# Patient Record
Sex: MALE | Race: WHITE
[De-identification: names, ages, dates, MRNs, and addresses within clinical notes are randomized per-mention and may not be internally consistent; named-entity substitution may affect disease eponyms.]

---

## 2019-02-11 ENCOUNTER — HOSPITAL ENCOUNTER (OUTPATIENT)
Dept: HOSPITAL 76 - DI | Age: 24
Discharge: HOME | End: 2019-02-11
Attending: FAMILY MEDICINE
Payer: COMMERCIAL

## 2019-02-11 DIAGNOSIS — R20.2: Primary | ICD-10-CM

## 2019-02-11 PROCEDURE — 72141 MRI NECK SPINE W/O DYE: CPT

## 2019-02-11 NOTE — MRI REPORT
Reason:  PARESTHESIA OF SKIN

Procedure Date:  02/11/2019   

Accession Number:  [id number] / [id number]                    

Procedure:  MRI - Cervical Spine W/O CPT Code:  

 

FULL RESULT:

 

 

EXAM:

MRI CERVICAL SPINE WITHOUT CONTRAST

 

EXAM DATE: 2/11/2019 10:03 AM.

 

CLINICAL HISTORY: Neck pain.

 

COMPARISONS: None.

 

TECHNIQUE: Multiplanar, multisequence T1-weighted and fluid-sensitive 

sequences of the cervical spine without contrast. Other: None.

 

FINDINGS:

 

There is normal alignment of the cervical spine. There is slight 

desiccation of the L5-S1 disk space without significant loss of height.

 

There are normal signal intensities demonstrated throughout the cervical 

spinal cord.

 

The bone marrow signal intensities are normal.

 

The craniocervical junction is normal.

 

C2-C3: There is no significant disk bulge, central or foraminal stenosis. 

The facets are normal.

 

C3-C4: There is no significant disk bulge, central or foraminal stenosis. 

The facets are normal.

 

C4-C5: There is no significant disk bulge, central or foraminal stenosis. 

The facets are normal.

 

C5-C6: There is no significant disk bulge, central or foraminal stenosis. 

The facets are normal.

 

C6-C7: There is no significant disk bulge, central or foraminal stenosis. 

The facets are normal.

 

C7-T1: There is no significant disk bulge, central or foraminal stenosis. 

The facets are normal.

IMPRESSION:

 

1. There is no significant disk bulge, central, or foraminal stenosis 

throughout the cervical spine.

2. There is minimal desiccation of the C5-C6 disk space without 

significant loss of height.

## 2019-06-27 ENCOUNTER — HOSPITAL ENCOUNTER (OUTPATIENT)
Age: 24
End: 2019-06-27
Payer: OTHER GOVERNMENT
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
## 2019-06-27 DIAGNOSIS — M48.07: ICD-10-CM

## 2019-06-27 DIAGNOSIS — S32.020A: Primary | ICD-10-CM

## 2019-06-27 DIAGNOSIS — M51.36: ICD-10-CM

## 2019-06-27 DIAGNOSIS — M51.37: ICD-10-CM

## 2019-06-27 DIAGNOSIS — M48.061: ICD-10-CM

## 2019-06-27 PROCEDURE — 72148 MRI LUMBAR SPINE W/O DYE: CPT

## 2020-08-26 ENCOUNTER — HOSPITAL ENCOUNTER (OUTPATIENT)
Age: 25
End: 2020-08-26
Payer: OTHER GOVERNMENT

## 2020-08-26 DIAGNOSIS — M25.511: Primary | ICD-10-CM

## 2020-08-26 DIAGNOSIS — M19.011: ICD-10-CM

## 2020-08-26 PROCEDURE — 23350 INJECTION FOR SHOULDER X-RAY: CPT

## 2020-08-26 PROCEDURE — 73222 MRI JOINT UPR EXTREM W/DYE: CPT

## 2020-08-26 PROCEDURE — 77002 NEEDLE LOCALIZATION BY XRAY: CPT

## 2020-08-26 NOTE — DI.MRI.S_ITS
PROCEDURE:  MR SHOULDER RT W CON  
   
INDICATIONS:  Pain in unspecified shoulder  
   
TECHNIQUE:    
After the administration of 12 mL of dilute intra-articular Gadolinium contrast, oblique   
coronal T1 and T2 spin echo with fat saturation, oblique sagittal T1 spin echo with and   
without fat saturation, oblique sagittal T2 fast spin echo with fat saturation, axial T1   
spin echo with fat saturation through the shoulder.    
   
COMPARISON:  [facility], , [place] SHOULDER INJECTION MR/CT RT, 8/26/2020, 7:47.  
   
FINDINGS:    
Image quality:  Excellent.    
   
Rotator cuff:  There is mild supraspinatus tendinosis.  The infraspinatus and teres minor   
tendons are intact.  The subscapularis tendon is intact.  There is no significant rotator   
cuff muscle atrophy.  
   
Bones and bursae:  No bone marrow contusions or fractures.  There is mild   
acromioclavicular joint osteoarthrosis.  The acromion demonstrates conventional anatomy,   
without an os acromiale.    
   
Capsule and soft tissues:  The labrum and glenohumeral ligaments appear intact.  There is   
moderate tendinosis of the intra-articular portion of the biceps long head tendon.  The   
rotator interval appears normal, without fibrosis.  The coracohumeral ligament is of   
normal thickness.  No intra-articular bodies.    
   
IMPRESSION:    
1. Mild supraspinatus tendinosis.  No significant rotator cuff tendon tear is seen.  
   
2. Moderate tendinosis of the biceps long head tendon.  
   
3. Intact glenoid labrum.  
   
4. Mild acromioclavicular joint osteoarthrosis.    
   
   
Dictated by: Rico Vazquez M.D. on 8/26/2020 at 9:57       
Approved by: Rico Vazquez M.D. on 8/26/2020 at 10:04

## 2020-08-26 NOTE — DI.RAD.S_ITS
PROCEDURE:  FL SHOULDER INJECTION MR/CT RT  
   
INDICATIONS:  Pain in unspecified shoulder  
   
TECHNIQUE:    
The indications, alternatives, benefits, risks, and complications of the procedure were   
explained to the patient.  Written informed consent was obtained and placed in the chart.   
 The shoulder was examined fluoroscopically and a site for needle placement chosen for   
entry into the glenohumeral joint from an anterior approach.  The skin was prepped and   
draped in a sterile fashion, and 1% lidocaine infiltrated from skin down to joint   
capsule.  A spinal needle was inserted into the glenohumeral joint, and a small amount of   
iodinated contrast media injected to confirm intra-articular placement of the needle tip.   
 This was followed by approximately 12 mL dilute solution of a gadolinium containing MR   
contrast agent.    
   
The needle was removed and a dressing was applied.  The patient was given postprocedural   
instructions and sent to the MR suite for MR imaging.    
   
FINDINGS:  A single fluoroscopic spot image demonstrates intra-articular location of   
injected iodinated contrast.  
   
IMPRESSION:  Successful fluoroscopically guided administration of dilute Gadolinium   
solution into the shoulder joint for MR arthrogram.    
   
   
Dictated by: Bret Roberts M.D. on 8/26/2020 at 14:49       
Approved by: Bert Roberts M.D. on 8/26/2020 at 14:49

## 2020-08-27 ENCOUNTER — HOSPITAL ENCOUNTER (OUTPATIENT)
Age: 25
End: 2020-08-27
Payer: OTHER GOVERNMENT

## 2020-08-27 DIAGNOSIS — M25.512: Primary | ICD-10-CM

## 2020-08-27 PROCEDURE — 23350 INJECTION FOR SHOULDER X-RAY: CPT

## 2020-08-27 PROCEDURE — 73222 MRI JOINT UPR EXTREM W/DYE: CPT

## 2020-08-27 PROCEDURE — 77002 NEEDLE LOCALIZATION BY XRAY: CPT

## 2020-08-27 NOTE — DI.RAD.S_ITS
PROCEDURE:  FL SHOULDER INJECTION MR/CT LT  
   
INDICATIONS:  Pain in unspecified shoulder  
   
TECHNIQUE:    
The indications, alternatives, benefits, risks, and complications of the procedure were   
explained to the patient.  Written informed consent was obtained and placed in the chart.   
 The shoulder was examined fluoroscopically and a site for needle placement chosen for   
entry into the glenohumeral joint from an anterior approach.  The skin was prepped and   
draped in a sterile fashion, and 1% lidocaine infiltrated from skin down to joint   
capsule.  A spinal needle was inserted into the glenohumeral joint, and a small amount of   
iodinated contrast media injected to confirm intra-articular placement of the needle tip.   
 This was followed by approximately 12 mL dilute solution of a gadolinium containing MR   
contrast agent.    
   
The needle was removed and a dressing was applied.  The patient was given postprocedural   
instructions and sent to the MR suite for MR imaging.    
   
FINDINGS:  A single fluoroscopic spot image demonstrates intra-articular location of   
injected iodinated contrast.  
   
IMPRESSION:  Successful fluoroscopically guided administration of dilute Gadolinium   
solution into the shoulder joint for MR arthrogram.    
   
   
Dictated by: Bret Roberts M.D. on 8/27/2020 at 10:26       
Approved by: Bret Roberts M.D. on 8/27/2020 at 10:27

## 2020-08-27 NOTE — DI.MRI.S_ITS
PROCEDURE:  MR SHOULDER LT W CON  
   
INDICATIONS:  Pain in unspecified shoulder  
   
TECHNIQUE:    
After the administration of 12 mL of dilute intra-articular Gadolinium contrast, oblique   
coronal T1 and T2 spin echo with fat saturation, oblique sagittal T1 spin echo with and   
without fat saturation, oblique sagittal T2 fast spin echo with fat saturation, axial T1   
spin echo with fat saturation through the shoulder.    
   
COMPARISON:  [facility], MR, MR SHOULDER RT W CON, 8/26/2020, 9:22.  
   
FINDINGS:    
Image quality:  Excellent.    
   
Rotator cuff:  The supraspinatus, infraspinatus, and subscapularis tendons appear intact   
throughout.  No rotator cuff muscle atrophy on sagittal images.    
   
Bones and bursae:  No bone marrow contusions or fractures.  No acromioclavicular joint   
degeneration.  The acromion demonstrates conventional anatomy, without an os acromiale.    
   
Capsule and soft tissues:  The labrum and glenohumeral ligaments appear intact.  The long   
head of the biceps tendon demonstrates normal location and morphology.  The rotator   
interval appears normal, without fibrosis.  The coracohumeral ligament is of normal   
thickness.  No intra-articular bodies.    
   
IMPRESSION:    
1. No rotator cuff tear.  
2. Negative evaluation of the labrum.  
3. No explanation for shoulder pain.    
   
   
Dictated by: Adalgisa Quiros M.D. on 8/27/2020 at 10:20       
Approved by: Adalgisa Quiros M.D. on 8/27/2020 at 10:22